# Patient Record
Sex: MALE | Race: WHITE | NOT HISPANIC OR LATINO | Employment: OTHER | URBAN - NONMETROPOLITAN AREA
[De-identification: names, ages, dates, MRNs, and addresses within clinical notes are randomized per-mention and may not be internally consistent; named-entity substitution may affect disease eponyms.]

---

## 2024-06-24 ENCOUNTER — OFFICE VISIT (OUTPATIENT)
Dept: CARDIOLOGY | Facility: CLINIC | Age: 54
End: 2024-06-24
Payer: MEDICARE

## 2024-06-24 VITALS
WEIGHT: 184 LBS | HEIGHT: 67 IN | SYSTOLIC BLOOD PRESSURE: 142 MMHG | DIASTOLIC BLOOD PRESSURE: 86 MMHG | OXYGEN SATURATION: 99 % | BODY MASS INDEX: 28.88 KG/M2 | HEART RATE: 82 BPM

## 2024-06-24 DIAGNOSIS — I44.7 LBBB (LEFT BUNDLE BRANCH BLOCK): ICD-10-CM

## 2024-06-24 DIAGNOSIS — R07.89 CHEST PAIN, ATYPICAL: Primary | ICD-10-CM

## 2024-06-24 DIAGNOSIS — R06.09 DYSPNEA ON EXERTION: ICD-10-CM

## 2024-06-24 PROCEDURE — 99204 OFFICE O/P NEW MOD 45 MIN: CPT | Performed by: EMERGENCY MEDICINE

## 2024-06-24 RX ORDER — OLMESARTAN MEDOXOMIL AND HYDROCHLOROTHIAZIDE 40/12.5 40; 12.5 MG/1; MG/1
TABLET ORAL
COMMUNITY
Start: 2024-05-20

## 2024-07-01 ENCOUNTER — HOSPITAL ENCOUNTER (OUTPATIENT)
Dept: CARDIOLOGY | Facility: HOSPITAL | Age: 54
Discharge: HOME | End: 2024-07-01
Admitting: EMERGENCY MEDICINE
Payer: MEDICARE

## 2024-07-01 VITALS
HEART RATE: 62 BPM | SYSTOLIC BLOOD PRESSURE: 132 MMHG | BODY MASS INDEX: 28.88 KG/M2 | DIASTOLIC BLOOD PRESSURE: 89 MMHG | WEIGHT: 184 LBS | HEIGHT: 67 IN

## 2024-07-01 DIAGNOSIS — R07.89 CHEST PAIN, ATYPICAL: ICD-10-CM

## 2024-07-01 LAB
BH CV STRESS BP STAGE 1: NORMAL
BH CV STRESS BP STAGE 2: NORMAL
BH CV STRESS BP STAGE 3: NORMAL
BH CV STRESS DURATION MIN STAGE 1: 3
BH CV STRESS DURATION MIN STAGE 2: 3
BH CV STRESS DURATION MIN STAGE 3: 3
BH CV STRESS DURATION SEC STAGE 1: 0
BH CV STRESS DURATION SEC STAGE 2: 0
BH CV STRESS DURATION SEC STAGE 3: 0
BH CV STRESS GRADE STAGE 1: 10
BH CV STRESS GRADE STAGE 2: 12
BH CV STRESS GRADE STAGE 3: 14
BH CV STRESS HR STAGE 1: 100
BH CV STRESS HR STAGE 2: 116
BH CV STRESS HR STAGE 3: 135
BH CV STRESS METS STAGE 1: 5
BH CV STRESS METS STAGE 2: 7.5
BH CV STRESS METS STAGE 3: 10
BH CV STRESS PROTOCOL 1: NORMAL
BH CV STRESS RECOVERY BP: NORMAL MMHG
BH CV STRESS RECOVERY HR: 68 BPM
BH CV STRESS SPEED STAGE 1: 1.7
BH CV STRESS SPEED STAGE 2: 2.5
BH CV STRESS SPEED STAGE 3: 3.4
BH CV STRESS STAGE 1: 1
BH CV STRESS STAGE 2: 2
BH CV STRESS STAGE 3: 3
MAXIMAL PREDICTED HEART RATE: 164 BPM
PERCENT MAX PREDICTED HR: 82.32 %
STRESS BASELINE BP: NORMAL MMHG
STRESS BASELINE HR: 60 BPM
STRESS PERCENT HR: 97 %
STRESS POST ESTIMATED WORKLOAD: 10 METS
STRESS POST EXERCISE DUR MIN: 9 MIN
STRESS POST EXERCISE DUR SEC: 0 SEC
STRESS POST PEAK BP: NORMAL MMHG
STRESS POST PEAK HR: 135 BPM
STRESS TARGET HR: 139 BPM

## 2024-07-01 PROCEDURE — 25510000001 PERFLUTREN 6.52 MG/ML SUSPENSION: Performed by: EMERGENCY MEDICINE

## 2024-07-01 PROCEDURE — 93352 ADMIN ECG CONTRAST AGENT: CPT | Performed by: EMERGENCY MEDICINE

## 2024-07-01 PROCEDURE — 93017 CV STRESS TEST TRACING ONLY: CPT

## 2024-07-01 PROCEDURE — 93018 CV STRESS TEST I&R ONLY: CPT | Performed by: EMERGENCY MEDICINE

## 2024-07-01 PROCEDURE — 93350 STRESS TTE ONLY: CPT | Performed by: EMERGENCY MEDICINE

## 2024-07-01 PROCEDURE — 93350 STRESS TTE ONLY: CPT

## 2024-07-01 RX ADMIN — PERFLUTREN 8.48 MG: 6.52 INJECTION, SUSPENSION INTRAVENOUS at 11:22

## 2024-07-01 NOTE — PROGRESS NOTES
P - CARDIOLOGY  New Patient Initial Outpatient [facility]    Primary Care Physician: Amandeep Nagel MD    Subjective     Chief Complaint   Patient presents with    Abnormal ECG        History of Present Illness  History of Present Illness  The patient presents for establishment of care.    The patient underwent an EKG, during which he was informed that the results were slightly different. Subsequently, he was unable to secure an appointment at [place] in [facility]. Approximately 1.5 months later, he was unable to attend the appointment due to a miscommunication with the physician. Subsequently, he underwent a routine checkup with his primary care physician, during which blood work was conducted. Despite this, he was informed that a follow-up was not scheduled. The patient denies experiencing chest pain, tightness, or pressure. He does, however, report occasional shortness of breath, which he attributes to his inactivity. Despite this, he is able to ambulate long distances without difficulty. He has a medical history of hypertension and hypercholesterolemia. His cholesterol levels have shown improvement, as evidenced by his reduced food intake and increased physical activity. He has also lost 10 pounds. His current medication regimen includes atorvastatin 20 mg, Olmesartan HCTZ, and cholesterol medication. He has undergone EKGs, stress tests, ultrasounds, or heart catheterizations.   He used to be a smoker, but he has quit.   His father had a heart attack at the age of 47 and was overweight.    Review of Systems   Constitutional: Negative for diaphoresis, fever and malaise/fatigue.   HENT:  Negative for congestion.    Eyes:  Negative for vision loss in left eye and vision loss in right eye.   Cardiovascular:  Positive for chest pain. Negative for claudication, dyspnea on exertion, irregular heartbeat, leg swelling, orthopnea, palpitations and syncope.   Respiratory:  Positive for shortness of breath. Negative  "for cough and wheezing.    Hematologic/Lymphatic: Negative for adenopathy.   Skin:  Negative for rash.   Musculoskeletal:  Negative for joint pain and joint swelling.   Gastrointestinal:  Negative for abdominal pain, diarrhea, nausea and vomiting.   Neurological:  Negative for excessive daytime sleepiness, dizziness, focal weakness, light-headedness, numbness and weakness.   Psychiatric/Behavioral:  Negative for depression. The patient does not have insomnia.         Otherwise complete ROS reviewed and negative except as mentioned in the HPI.      Past Medical History: No past medical history on file.    Past Surgical History:No past surgical history on file.    Family History: family history includes Heart attack in his father.    Social History:  reports that he has been smoking cigarettes. He has never used smokeless tobacco. He reports current alcohol use. He reports that he does not use drugs.    Medications:  Prior to Admission medications    Medication Sig Start Date End Date Taking? Authorizing Provider   olmesartan-hydrochlorothiazide (BENICAR HCT) 40-12.5 MG per tablet  5/20/24  Yes Provider, Historical, MD     Allergies:  No Known Allergies    Objective     Vital Signs: /86   Pulse 82   Ht 170.2 cm (67\")   Wt 83.5 kg (184 lb)   SpO2 99%   BMI 28.82 kg/m²     Vitals and nursing note reviewed.   Constitutional:       Appearance: Normal and healthy appearance. Well-developed and not in distress.   Eyes:      Extraocular Movements: Extraocular movements intact.      Pupils: Pupils are equal, round, and reactive to light.   HENT:      Head: Normocephalic and atraumatic.    Mouth/Throat:      Pharynx: Oropharynx is clear.   Neck:      Vascular: JVD normal.      Trachea: Trachea normal.   Pulmonary:      Effort: Pulmonary effort is normal.      Breath sounds: Normal breath sounds. No wheezing. No rhonchi. No rales.   Cardiovascular:      PMI at left midclavicular line. Normal rate. Regular rhythm. " "Normal S1. Normal S2.       Murmurs: There is a grade 2/6 systolic murmur.      No gallop.  No click. No rub.   Pulses:     Dorsalis pedis: 2+ bilaterally.     Posterior tibial: 2+ bilaterally.  Abdominal:      General: Bowel sounds are normal.      Palpations: Abdomen is soft.      Tenderness: There is no abdominal tenderness.   Musculoskeletal: Normal range of motion.      Cervical back: Normal range of motion and neck supple. Skin:     General: Skin is warm and dry.      Capillary Refill: Capillary refill takes less than 2 seconds.   Feet:      Right foot:      Skin integrity: Skin integrity normal.      Left foot:      Skin integrity: Skin integrity normal.   Neurological:      Mental Status: Alert and oriented to person, place and time.      Sensory: Sensation is intact.      Motor: Motor function is intact.      Coordination: Coordination is intact.   Psychiatric:         Speech: Speech normal.         Behavior: Behavior is cooperative.       Physical Exam      Results Reviewed:      ECG 12 Lead    Date/Time: 7/7/2024 7:40 PM  Performed by: Byron Garcia DO    Authorized by: Byron Garcia DO  Comparison: compared with previous ECG   Similar to previous ECG  Rhythm: sinus rhythm  Rate: normal  Conduction: left bundle branch block  QRS axis: left    Clinical impression: abnormal EKG          Results  Laboratory Studies  Total cholesterol 211, bad cholesterol 138, triglyceride 156, and good cholesterol 42.    Testing  EKG showed left bundle branch block.    No results found for: \"CHOL\", \"TRIG\", \"HDL\", \"VLDL\", \"LDLHDL\"  No results found for: \"HGBA1C\"    Assessment / Plan        Problem List Items Addressed This Visit       Chest pain, atypical - Primary    Relevant Orders    Adult Stress Echo W/ Cont or Stress Agent if Necessary Per Protocol (Completed)    Dyspnea on exertion    Relevant Orders    Adult Transthoracic Echo Complete W/ Cont if Necessary Per Protocol    LBBB (left bundle " branch block)     Assessment & Plan  1. Cardiac evaluation.  Given the patient's familial predisposition, it is prudent to conduct a stress echocardiogram and a dobutamine echocardiogram to ensure optimal cardiac health. Should any abnormalities be detected, we will discuss the subsequent course of action.    Follow-up  The patient is scheduled for a follow-up visit in 1 month.      Transcribed from ambient dictation for Byron Garcia DO by Byron Garcia DO.  07/01/24   13:32 CDT    Patient or patient representative verbalized consent for the use of Ambient Listening during the visit with  Byron Garcia DO for chart documentation. 7/7/2024  19:41 CDT

## 2024-07-05 DIAGNOSIS — R94.39 ABNORMAL STRESS TEST: Primary | ICD-10-CM

## 2024-07-07 PROBLEM — I44.7 LBBB (LEFT BUNDLE BRANCH BLOCK): Status: ACTIVE | Noted: 2024-07-07

## 2024-07-07 PROBLEM — R06.09 DYSPNEA ON EXERTION: Status: ACTIVE | Noted: 2024-07-07

## 2024-07-07 PROBLEM — R07.89 CHEST PAIN, ATYPICAL: Status: ACTIVE | Noted: 2024-07-07

## 2024-07-07 PROCEDURE — 93000 ELECTROCARDIOGRAM COMPLETE: CPT | Performed by: EMERGENCY MEDICINE

## 2024-07-12 ENCOUNTER — HOSPITAL ENCOUNTER (OUTPATIENT)
Facility: HOSPITAL | Age: 54
Setting detail: HOSPITAL OUTPATIENT SURGERY
Discharge: HOME | End: 2024-07-12
Attending: EMERGENCY MEDICINE | Admitting: EMERGENCY MEDICINE
Payer: MEDICARE

## 2024-07-12 VITALS
HEART RATE: 64 BPM | HEIGHT: 67 IN | RESPIRATION RATE: 14 BRPM | SYSTOLIC BLOOD PRESSURE: 140 MMHG | DIASTOLIC BLOOD PRESSURE: 85 MMHG | TEMPERATURE: 97.6 F | WEIGHT: 185.2 LBS | BODY MASS INDEX: 29.07 KG/M2 | OXYGEN SATURATION: 96 %

## 2024-07-12 DIAGNOSIS — R94.39 ABNORMAL STRESS TEST: ICD-10-CM

## 2024-07-12 LAB
ANION GAP SERPL CALCULATED.3IONS-SCNC: 11 MMOL/L (ref 5–15)
BASOPHILS # BLD AUTO: 0.08 10*3/MM3 (ref 0–0.2)
BASOPHILS NFR BLD AUTO: 0.9 % (ref 0–1.5)
BUN SERPL-MCNC: 20 MG/DL (ref 6–20)
BUN/CREAT SERPL: 19.6 (ref 7–25)
CALCIUM SPEC-SCNC: 9.1 MG/DL (ref 8.6–10.5)
CHLORIDE SERPL-SCNC: 100 MMOL/L (ref 98–107)
CO2 SERPL-SCNC: 25 MMOL/L (ref 22–29)
CREAT SERPL-MCNC: 1.02 MG/DL (ref 0.76–1.27)
DEPRECATED RDW RBC AUTO: 42.3 FL (ref 37–54)
EGFRCR SERPLBLD CKD-EPI 2021: 86.3 ML/MIN/1.73
EOSINOPHIL # BLD AUTO: 0.09 10*3/MM3 (ref 0–0.4)
EOSINOPHIL NFR BLD AUTO: 1 % (ref 0.3–6.2)
ERYTHROCYTE [DISTWIDTH] IN BLOOD BY AUTOMATED COUNT: 12.8 % (ref 12.3–15.4)
GLUCOSE SERPL-MCNC: 111 MG/DL (ref 65–99)
HCT VFR BLD AUTO: 46.6 % (ref 37.5–51)
HGB BLD-MCNC: 15.9 G/DL (ref 13–17.7)
IMM GRANULOCYTES # BLD AUTO: 0.03 10*3/MM3 (ref 0–0.05)
IMM GRANULOCYTES NFR BLD AUTO: 0.3 % (ref 0–0.5)
INR PPP: 0.95 (ref 0.91–1.09)
LYMPHOCYTES # BLD AUTO: 2.47 10*3/MM3 (ref 0.7–3.1)
LYMPHOCYTES NFR BLD AUTO: 26.4 % (ref 19.6–45.3)
MCH RBC QN AUTO: 30.8 PG (ref 26.6–33)
MCHC RBC AUTO-ENTMCNC: 34.1 G/DL (ref 31.5–35.7)
MCV RBC AUTO: 90.3 FL (ref 79–97)
MONOCYTES # BLD AUTO: 0.62 10*3/MM3 (ref 0.1–0.9)
MONOCYTES NFR BLD AUTO: 6.6 % (ref 5–12)
NEUTROPHILS NFR BLD AUTO: 6.07 10*3/MM3 (ref 1.7–7)
NEUTROPHILS NFR BLD AUTO: 64.8 % (ref 42.7–76)
NRBC BLD AUTO-RTO: 0 /100 WBC (ref 0–0.2)
PLATELET # BLD AUTO: 175 10*3/MM3 (ref 140–450)
PMV BLD AUTO: 10.9 FL (ref 6–12)
POTASSIUM SERPL-SCNC: 4 MMOL/L (ref 3.5–5.2)
PROTHROMBIN TIME: 13.1 SECONDS (ref 11.8–14.8)
RBC # BLD AUTO: 5.16 10*6/MM3 (ref 4.14–5.8)
SODIUM SERPL-SCNC: 136 MMOL/L (ref 136–145)
WBC NRBC COR # BLD AUTO: 9.36 10*3/MM3 (ref 3.4–10.8)

## 2024-07-12 PROCEDURE — S0260 H&P FOR SURGERY: HCPCS | Performed by: EMERGENCY MEDICINE

## 2024-07-12 PROCEDURE — 85025 COMPLETE CBC W/AUTO DIFF WBC: CPT | Performed by: EMERGENCY MEDICINE

## 2024-07-12 PROCEDURE — 80048 BASIC METABOLIC PNL TOTAL CA: CPT | Performed by: EMERGENCY MEDICINE

## 2024-07-12 PROCEDURE — 85610 PROTHROMBIN TIME: CPT | Performed by: EMERGENCY MEDICINE

## 2024-07-12 RX ORDER — SODIUM CHLORIDE 0.9 % (FLUSH) 0.9 %
10 SYRINGE (ML) INJECTION AS NEEDED
Status: DISCONTINUED | OUTPATIENT
Start: 2024-07-12 | End: 2024-07-12 | Stop reason: HOSPADM

## 2024-07-12 RX ORDER — SODIUM CHLORIDE 0.9 % (FLUSH) 0.9 %
10 SYRINGE (ML) INJECTION EVERY 12 HOURS SCHEDULED
Status: DISCONTINUED | OUTPATIENT
Start: 2024-07-12 | End: 2024-07-12 | Stop reason: HOSPADM

## 2024-07-12 NOTE — H&P
Patient seen and examined at bedside.  The history and physical not changed as below.    We will be performing a diagnostic left heart catheterization today with possible intervention based on findings.    Risk, benefits and alternatives were explained to the patient he wished to proceed.         P - CARDIOLOGY  New Patient Initial Outpatient [facility]     Primary Care Physician: Amandeep Nagel MD     Subjective          Chief Complaint   Patient presents with    Abnormal ECG         History of Present Illness  History of Present Illness  The patient presents for establishment of care.     The patient underwent an EKG, during which he was informed that the results were slightly different. Subsequently, he was unable to secure an appointment at [place] in [facility]. Approximately 1.5 months later, he was unable to attend the appointment due to a miscommunication with the physician. Subsequently, he underwent a routine checkup with his primary care physician, during which blood work was conducted. Despite this, he was informed that a follow-up was not scheduled. The patient denies experiencing chest pain, tightness, or pressure. He does, however, report occasional shortness of breath, which he attributes to his inactivity. Despite this, he is able to ambulate long distances without difficulty. He has a medical history of hypertension and hypercholesterolemia. His cholesterol levels have shown improvement, as evidenced by his reduced food intake and increased physical activity. He has also lost 10 pounds. His current medication regimen includes atorvastatin 20 mg, Olmesartan HCTZ, and cholesterol medication. He has undergone EKGs, stress tests, ultrasounds, or heart catheterizations.   He used to be a smoker, but he has quit.   His father had a heart attack at the age of 47 and was overweight.     Review of Systems   Constitutional: Negative for diaphoresis, fever and malaise/fatigue.   HENT:  Negative for  "congestion.    Eyes:  Negative for vision loss in left eye and vision loss in right eye.   Cardiovascular:  Positive for chest pain. Negative for claudication, dyspnea on exertion, irregular heartbeat, leg swelling, orthopnea, palpitations and syncope.   Respiratory:  Positive for shortness of breath. Negative for cough and wheezing.    Hematologic/Lymphatic: Negative for adenopathy.   Skin:  Negative for rash.   Musculoskeletal:  Negative for joint pain and joint swelling.   Gastrointestinal:  Negative for abdominal pain, diarrhea, nausea and vomiting.   Neurological:  Negative for excessive daytime sleepiness, dizziness, focal weakness, light-headedness, numbness and weakness.   Psychiatric/Behavioral:  Negative for depression. The patient does not have insomnia.          Otherwise complete ROS reviewed and negative except as mentioned in the HPI.        Past Medical History:   Medical History   No past medical history on file.        Past Surgical History:  Surgical History   No past surgical history on file.        Family History: family history includes Heart attack in his father.     Social History:  reports that he has been smoking cigarettes. He has never used smokeless tobacco. He reports current alcohol use. He reports that he does not use drugs.     Medications:          Prior to Admission medications    Medication Sig Start Date End Date Taking? Authorizing Provider   olmesartan-hydrochlorothiazide (BENICAR HCT) 40-12.5 MG per tablet   5/20/24   Yes Provider, Historical, MD      Allergies:  Allergies   No Known Allergies        Objective      Vital Signs: /86   Pulse 82   Ht 170.2 cm (67\")   Wt 83.5 kg (184 lb)   SpO2 99%   BMI 28.82 kg/m²      Vitals and nursing note reviewed.   Constitutional:       Appearance: Normal and healthy appearance. Well-developed and not in distress.   Eyes:      Extraocular Movements: Extraocular movements intact.      Pupils: Pupils are equal, round, and reactive " "to light.   HENT:      Head: Normocephalic and atraumatic.    Mouth/Throat:      Pharynx: Oropharynx is clear.   Neck:      Vascular: JVD normal.      Trachea: Trachea normal.   Pulmonary:      Effort: Pulmonary effort is normal.      Breath sounds: Normal breath sounds. No wheezing. No rhonchi. No rales.   Cardiovascular:      PMI at left midclavicular line. Normal rate. Regular rhythm. Normal S1. Normal S2.       Murmurs: There is a grade 2/6 systolic murmur.      No gallop.  No click. No rub.   Pulses:     Dorsalis pedis: 2+ bilaterally.     Posterior tibial: 2+ bilaterally.  Abdominal:      General: Bowel sounds are normal.      Palpations: Abdomen is soft.      Tenderness: There is no abdominal tenderness.   Musculoskeletal: Normal range of motion.      Cervical back: Normal range of motion and neck supple. Skin:     General: Skin is warm and dry.      Capillary Refill: Capillary refill takes less than 2 seconds.   Feet:      Right foot:      Skin integrity: Skin integrity normal.      Left foot:      Skin integrity: Skin integrity normal.   Neurological:      Mental Status: Alert and oriented to person, place and time.      Sensory: Sensation is intact.      Motor: Motor function is intact.      Coordination: Coordination is intact.   Psychiatric:         Speech: Speech normal.         Behavior: Behavior is cooperative.         Physical Exam        Results Reviewed:        ECG 12 Lead     Date/Time: 7/7/2024 7:40 PM  Performed by: Byron Garcia DO     Authorized by: Byron Garcia DO  Comparison: compared with previous ECG   Similar to previous ECG  Rhythm: sinus rhythm  Rate: normal  Conduction: left bundle branch block  QRS axis: left     Clinical impression: abnormal EKG              Results  Laboratory Studies  Total cholesterol 211, bad cholesterol 138, triglyceride 156, and good cholesterol 42.     Testing  EKG showed left bundle branch block.     No results found for: \"CHOL\", " "\"TRIG\", \"HDL\", \"VLDL\", \"LDLHDL\"  No results found for: \"HGBA1C\"     Assessment / Plan         Problem List Items Addressed This Visit         Chest pain, atypical - Primary     Relevant Orders     Adult Stress Echo W/ Cont or Stress Agent if Necessary Per Protocol (Completed)     Dyspnea on exertion     Relevant Orders     Adult Transthoracic Echo Complete W/ Cont if Necessary Per Protocol     LBBB (left bundle branch block)      Assessment & Plan  1. Cardiac evaluation.  Given the patient's familial predisposition, it is prudent to conduct a stress echocardiogram and a dobutamine echocardiogram to ensure optimal cardiac health. Should any abnormalities be detected, we will discuss the subsequent course of action.     Follow-up  The patient is scheduled for a follow-up visit in 1 month.        Transcribed from ambient dictation for Byron Garcia DO by Byron Garcia DO.  07/01/24   13:32 CDT         Interpretation Summary         Overall, this is a high risk test for ischemia.  Recommend further evaluation to rule out significant coronary artery disease in this patient.    Abnormal stress echo consistent with a high risk study for myocardial ischemia.    Left ventricular systolic function is moderately decreased. Left ventricular ejection fraction appears to be 36 - 40%.    The following left ventricular wall segments are hypokinetic: apical anterior, apical lateral, apical inferior, mid inferior, apical septal, mid inferoseptal and apex hypokinetic.    Ding treadmill score of 9    Excellent functional capacity at 10 metabolic equivalents    Equivocal EKG changes during stress, no clinical symptoms of ischemia during stress    Appropriate heart rate response to stress, hypertensive blood pressure response to stress     "

## 2024-07-12 NOTE — NURSING NOTE
Pt called me into the room to ask how much longer until his procedure, he was scheduled at 1100, this was 1120. I informed pt that Dr. Garcia was almost done with the case, however patient stated that he would just rather reschedule due to his nerves. Informed reading room to reschedule and notified patient that he will be called with new date.   Pt was d/c in no acute distress.

## 2024-07-25 DIAGNOSIS — R94.39 ABNORMAL STRESS TEST: Primary | ICD-10-CM

## 2024-08-02 ENCOUNTER — HOSPITAL ENCOUNTER (OUTPATIENT)
Facility: HOSPITAL | Age: 54
Setting detail: HOSPITAL OUTPATIENT SURGERY
Discharge: HOME | End: 2024-08-02
Attending: EMERGENCY MEDICINE | Admitting: EMERGENCY MEDICINE
Payer: MEDICARE

## 2024-08-02 ENCOUNTER — HOSPITAL ENCOUNTER (OUTPATIENT)
Dept: CARDIOLOGY | Facility: HOSPITAL | Age: 54
Discharge: HOME | End: 2024-08-02
Payer: MEDICARE

## 2024-08-02 VITALS
RESPIRATION RATE: 20 BRPM | DIASTOLIC BLOOD PRESSURE: 70 MMHG | HEART RATE: 59 BPM | OXYGEN SATURATION: 97 % | SYSTOLIC BLOOD PRESSURE: 151 MMHG | BODY MASS INDEX: 28.25 KG/M2 | HEIGHT: 67 IN | WEIGHT: 180 LBS

## 2024-08-02 VITALS
SYSTOLIC BLOOD PRESSURE: 140 MMHG | BODY MASS INDEX: 29.03 KG/M2 | DIASTOLIC BLOOD PRESSURE: 85 MMHG | WEIGHT: 185 LBS | HEIGHT: 67 IN

## 2024-08-02 DIAGNOSIS — R94.39 ABNORMAL STRESS TEST: ICD-10-CM

## 2024-08-02 DIAGNOSIS — R06.09 DYSPNEA ON EXERTION: ICD-10-CM

## 2024-08-02 LAB
ANION GAP SERPL CALCULATED.3IONS-SCNC: 11 MMOL/L (ref 5–15)
BH CV ECHO LEFT VENTRICLE GLOBAL LONGITUDINAL STRAIN: -11 %
BH CV ECHO MEAS - AO MAX PG: 4.7 MMHG
BH CV ECHO MEAS - AO MEAN PG: 2 MMHG
BH CV ECHO MEAS - AO ROOT DIAM: 3.1 CM
BH CV ECHO MEAS - AO V2 MAX: 108 CM/SEC
BH CV ECHO MEAS - AO V2 VTI: 24.4 CM
BH CV ECHO MEAS - AVA(I,D): 3.9 CM2
BH CV ECHO MEAS - EDV(CUBED): 188.1 ML
BH CV ECHO MEAS - EDV(MOD-SP2): 118 ML
BH CV ECHO MEAS - EDV(MOD-SP4): 112 ML
BH CV ECHO MEAS - EF(MOD-BP): 50 %
BH CV ECHO MEAS - EF(MOD-SP2): 56.1 %
BH CV ECHO MEAS - EF(MOD-SP4): 47.9 %
BH CV ECHO MEAS - ESV(CUBED): 58 ML
BH CV ECHO MEAS - ESV(MOD-SP2): 51.8 ML
BH CV ECHO MEAS - ESV(MOD-SP4): 58.4 ML
BH CV ECHO MEAS - FS: 32.5 %
BH CV ECHO MEAS - IVS/LVPW: 0.88 CM
BH CV ECHO MEAS - IVSD: 1.24 CM
BH CV ECHO MEAS - LA DIMENSION: 4.3 CM
BH CV ECHO MEAS - LAT PEAK E' VEL: 7.1 CM/SEC
BH CV ECHO MEAS - LV DIASTOLIC VOL/BSA (35-75): 57.3 CM2
BH CV ECHO MEAS - LV MASS(C)D: 333.7 GRAMS
BH CV ECHO MEAS - LV MAX PG: 3 MMHG
BH CV ECHO MEAS - LV MEAN PG: 1 MMHG
BH CV ECHO MEAS - LV SYSTOLIC VOL/BSA (12-30): 29.9 CM2
BH CV ECHO MEAS - LV V1 MAX: 86.2 CM/SEC
BH CV ECHO MEAS - LV V1 VTI: 18.1 CM
BH CV ECHO MEAS - LVIDD: 5.7 CM
BH CV ECHO MEAS - LVIDS: 3.9 CM
BH CV ECHO MEAS - LVOT AREA: 5.3 CM2
BH CV ECHO MEAS - LVOT DIAM: 2.6 CM
BH CV ECHO MEAS - LVPWD: 1.41 CM
BH CV ECHO MEAS - MED PEAK E' VEL: 4.6 CM/SEC
BH CV ECHO MEAS - MR MAX PG: 43 MMHG
BH CV ECHO MEAS - MR MAX VEL: 328 CM/SEC
BH CV ECHO MEAS - MV A MAX VEL: 65.5 CM/SEC
BH CV ECHO MEAS - MV DEC TIME: 0.25 SEC
BH CV ECHO MEAS - MV E MAX VEL: 51.4 CM/SEC
BH CV ECHO MEAS - MV E/A: 0.78
BH CV ECHO MEAS - PA V2 MAX: 161 CM/SEC
BH CV ECHO MEAS - RAP SYSTOLE: 3 MMHG
BH CV ECHO MEAS - RVSP: 5.7 MMHG
BH CV ECHO MEAS - SV(LVOT): 96.1 ML
BH CV ECHO MEAS - SV(MOD-SP2): 66.2 ML
BH CV ECHO MEAS - SV(MOD-SP4): 53.6 ML
BH CV ECHO MEAS - SVI(LVOT): 49.1 ML/M2
BH CV ECHO MEAS - SVI(MOD-SP2): 33.8 ML/M2
BH CV ECHO MEAS - SVI(MOD-SP4): 27.4 ML/M2
BH CV ECHO MEAS - TAPSE (>1.6): 1.93 CM
BH CV ECHO MEAS - TR MAX PG: 2.7 MMHG
BH CV ECHO MEAS - TR MAX VEL: 82.1 CM/SEC
BH CV ECHO MEASUREMENTS AVERAGE E/E' RATIO: 8.79
BUN SERPL-MCNC: 16 MG/DL (ref 6–20)
BUN/CREAT SERPL: 17 (ref 7–25)
CALCIUM SPEC-SCNC: 9 MG/DL (ref 8.6–10.5)
CHLORIDE SERPL-SCNC: 100 MMOL/L (ref 98–107)
CO2 SERPL-SCNC: 26 MMOL/L (ref 22–29)
CREAT SERPL-MCNC: 0.94 MG/DL (ref 0.76–1.27)
DEPRECATED RDW RBC AUTO: 41.7 FL (ref 37–54)
EGFRCR SERPLBLD CKD-EPI 2021: 95.1 ML/MIN/1.73
ERYTHROCYTE [DISTWIDTH] IN BLOOD BY AUTOMATED COUNT: 12.6 % (ref 12.3–15.4)
GLUCOSE SERPL-MCNC: 94 MG/DL (ref 65–99)
HCT VFR BLD AUTO: 43.8 % (ref 37.5–51)
HGB BLD-MCNC: 15 G/DL (ref 13–17.7)
INR PPP: 0.96 (ref 0.91–1.09)
LEFT ATRIUM VOLUME INDEX: 19.5 ML/M2
MCH RBC QN AUTO: 31.3 PG (ref 26.6–33)
MCHC RBC AUTO-ENTMCNC: 34.2 G/DL (ref 31.5–35.7)
MCV RBC AUTO: 91.3 FL (ref 79–97)
PLATELET # BLD AUTO: 180 10*3/MM3 (ref 140–450)
PMV BLD AUTO: 11 FL (ref 6–12)
POTASSIUM SERPL-SCNC: 3.8 MMOL/L (ref 3.5–5.2)
PROTHROMBIN TIME: 13.2 SECONDS (ref 11.8–14.8)
RBC # BLD AUTO: 4.8 10*6/MM3 (ref 4.14–5.8)
SODIUM SERPL-SCNC: 137 MMOL/L (ref 136–145)
WBC NRBC COR # BLD AUTO: 10.78 10*3/MM3 (ref 3.4–10.8)

## 2024-08-02 PROCEDURE — 80048 BASIC METABOLIC PNL TOTAL CA: CPT | Performed by: EMERGENCY MEDICINE

## 2024-08-02 PROCEDURE — 25510000001 IOPAMIDOL PER 1 ML: Performed by: EMERGENCY MEDICINE

## 2024-08-02 PROCEDURE — 25010000002 MIDAZOLAM HCL (PF) 5 MG/5ML SOLUTION: Performed by: EMERGENCY MEDICINE

## 2024-08-02 PROCEDURE — 25010000002 DIPHENHYDRAMINE PER 50 MG: Performed by: EMERGENCY MEDICINE

## 2024-08-02 PROCEDURE — 93306 TTE W/DOPPLER COMPLETE: CPT

## 2024-08-02 PROCEDURE — 93458 L HRT ARTERY/VENTRICLE ANGIO: CPT | Performed by: EMERGENCY MEDICINE

## 2024-08-02 PROCEDURE — 93356 MYOCRD STRAIN IMG SPCKL TRCK: CPT

## 2024-08-02 PROCEDURE — 99152 MOD SED SAME PHYS/QHP 5/>YRS: CPT | Performed by: EMERGENCY MEDICINE

## 2024-08-02 PROCEDURE — S0260 H&P FOR SURGERY: HCPCS | Performed by: EMERGENCY MEDICINE

## 2024-08-02 PROCEDURE — C1769 GUIDE WIRE: HCPCS | Performed by: EMERGENCY MEDICINE

## 2024-08-02 PROCEDURE — 85027 COMPLETE CBC AUTOMATED: CPT | Performed by: EMERGENCY MEDICINE

## 2024-08-02 PROCEDURE — 85610 PROTHROMBIN TIME: CPT | Performed by: EMERGENCY MEDICINE

## 2024-08-02 PROCEDURE — 25010000002 HEPARIN (PORCINE) 2000-0.9 UNIT/L-% SOLUTION: Performed by: EMERGENCY MEDICINE

## 2024-08-02 PROCEDURE — 25010000002 HEPARIN (PORCINE) PER 1000 UNITS: Performed by: EMERGENCY MEDICINE

## 2024-08-02 PROCEDURE — 25010000002 FENTANYL CITRATE (PF) 50 MCG/ML SOLUTION: Performed by: EMERGENCY MEDICINE

## 2024-08-02 PROCEDURE — 25010000002 HEPARIN (PORCINE) 1000-0.9 UT/500ML-% SOLUTION: Performed by: EMERGENCY MEDICINE

## 2024-08-02 PROCEDURE — C1894 INTRO/SHEATH, NON-LASER: HCPCS | Performed by: EMERGENCY MEDICINE

## 2024-08-02 RX ORDER — HEPARIN SODIUM 200 [USP'U]/100ML
INJECTION, SOLUTION INTRAVENOUS
Status: DISCONTINUED | OUTPATIENT
Start: 2024-08-02 | End: 2024-08-02 | Stop reason: HOSPADM

## 2024-08-02 RX ORDER — SODIUM CHLORIDE 9 MG/ML
100 INJECTION, SOLUTION INTRAVENOUS CONTINUOUS
Status: CANCELLED | OUTPATIENT
Start: 2024-08-02

## 2024-08-02 RX ORDER — SODIUM CHLORIDE 0.9 % (FLUSH) 0.9 %
10 SYRINGE (ML) INJECTION AS NEEDED
Status: DISCONTINUED | OUTPATIENT
Start: 2024-08-02 | End: 2024-08-02 | Stop reason: HOSPADM

## 2024-08-02 RX ORDER — HEPARIN SODIUM 1000 [USP'U]/ML
INJECTION, SOLUTION INTRAVENOUS; SUBCUTANEOUS
Status: DISCONTINUED | OUTPATIENT
Start: 2024-08-02 | End: 2024-08-02 | Stop reason: HOSPADM

## 2024-08-02 RX ORDER — DIAZEPAM 10 MG/1
10 TABLET ORAL ONCE
Status: COMPLETED | OUTPATIENT
Start: 2024-08-02 | End: 2024-08-02

## 2024-08-02 RX ORDER — NITROGLYCERIN 0.4 MG/1
0.4 TABLET SUBLINGUAL
Status: CANCELLED | OUTPATIENT
Start: 2024-08-02

## 2024-08-02 RX ORDER — SODIUM CHLORIDE 0.9 % (FLUSH) 0.9 %
10 SYRINGE (ML) INJECTION EVERY 12 HOURS SCHEDULED
Status: DISCONTINUED | OUTPATIENT
Start: 2024-08-02 | End: 2024-08-02 | Stop reason: HOSPADM

## 2024-08-02 RX ORDER — ACETAMINOPHEN 325 MG/1
650 TABLET ORAL EVERY 4 HOURS PRN
Status: CANCELLED | OUTPATIENT
Start: 2024-08-02

## 2024-08-02 RX ORDER — DIPHENHYDRAMINE HYDROCHLORIDE 50 MG/ML
INJECTION INTRAMUSCULAR; INTRAVENOUS
Status: DISCONTINUED | OUTPATIENT
Start: 2024-08-02 | End: 2024-08-02 | Stop reason: HOSPADM

## 2024-08-02 RX ORDER — VERAPAMIL HYDROCHLORIDE 2.5 MG/ML
INJECTION, SOLUTION INTRAVENOUS
Status: DISCONTINUED | OUTPATIENT
Start: 2024-08-02 | End: 2024-08-02 | Stop reason: HOSPADM

## 2024-08-02 RX ORDER — FENTANYL CITRATE 50 UG/ML
INJECTION, SOLUTION INTRAMUSCULAR; INTRAVENOUS
Status: DISCONTINUED | OUTPATIENT
Start: 2024-08-02 | End: 2024-08-02 | Stop reason: HOSPADM

## 2024-08-02 RX ORDER — LIDOCAINE HYDROCHLORIDE 20 MG/ML
INJECTION, SOLUTION INFILTRATION; PERINEURAL
Status: DISCONTINUED | OUTPATIENT
Start: 2024-08-02 | End: 2024-08-02 | Stop reason: HOSPADM

## 2024-08-02 RX ORDER — SODIUM CHLORIDE 9 MG/ML
1 INJECTION, SOLUTION INTRAVENOUS CONTINUOUS
Status: CANCELLED | OUTPATIENT
Start: 2024-08-02 | End: 2024-08-02

## 2024-08-02 RX ORDER — MIDAZOLAM HYDROCHLORIDE 5 MG/5ML
INJECTION, SOLUTION INTRAMUSCULAR; INTRAVENOUS
Status: DISCONTINUED | OUTPATIENT
Start: 2024-08-02 | End: 2024-08-02 | Stop reason: HOSPADM

## 2024-08-02 RX ADMIN — DIAZEPAM 10 MG: 10 TABLET ORAL at 11:41

## 2024-08-02 NOTE — H&P
Seen and examined at bedside.  History and physical not changed as below.    We are performing diagnostic left heart catheterization with possible invention based on fines.    Risk benefits and alternatives were explained to the patient he wished proceed.    P - CARDIOLOGY  New Patient Initial Outpatient [facility]     Primary Care Physician: Amandeep Nagel MD     Subjective            Chief Complaint   Patient presents with    Abnormal ECG         History of Present Illness  History of Present Illness  The patient presents for establishment of care.     The patient underwent an EKG, during which he was informed that the results were slightly different. Subsequently, he was unable to secure an appointment at [place] in [facility]. Approximately 1.5 months later, he was unable to attend the appointment due to a miscommunication with the physician. Subsequently, he underwent a routine checkup with his primary care physician, during which blood work was conducted. Despite this, he was informed that a follow-up was not scheduled. The patient denies experiencing chest pain, tightness, or pressure. He does, however, report occasional shortness of breath, which he attributes to his inactivity. Despite this, he is able to ambulate long distances without difficulty. He has a medical history of hypertension and hypercholesterolemia. His cholesterol levels have shown improvement, as evidenced by his reduced food intake and increased physical activity. He has also lost 10 pounds. His current medication regimen includes atorvastatin 20 mg, Olmesartan HCTZ, and cholesterol medication. He has undergone EKGs, stress tests, ultrasounds, or heart catheterizations.   He used to be a smoker, but he has quit.   His father had a heart attack at the age of 47 and was overweight.     Review of Systems   Constitutional: Negative for diaphoresis, fever and malaise/fatigue.   HENT:  Negative for congestion.    Eyes:  Negative for vision loss  "in left eye and vision loss in right eye.   Cardiovascular:  Positive for chest pain. Negative for claudication, dyspnea on exertion, irregular heartbeat, leg swelling, orthopnea, palpitations and syncope.   Respiratory:  Positive for shortness of breath. Negative for cough and wheezing.    Hematologic/Lymphatic: Negative for adenopathy.   Skin:  Negative for rash.   Musculoskeletal:  Negative for joint pain and joint swelling.   Gastrointestinal:  Negative for abdominal pain, diarrhea, nausea and vomiting.   Neurological:  Negative for excessive daytime sleepiness, dizziness, focal weakness, light-headedness, numbness and weakness.   Psychiatric/Behavioral:  Negative for depression. The patient does not have insomnia.          Otherwise complete ROS reviewed and negative except as mentioned in the HPI.        Past Medical History:   Medical History   No past medical history on file.         Past Surgical History:  Surgical History   No past surgical history on file.         Family History: family history includes Heart attack in his father.     Social History:  reports that he has been smoking cigarettes. He has never used smokeless tobacco. He reports current alcohol use. He reports that he does not use drugs.     Medications:                Prior to Admission medications    Medication Sig Start Date End Date Taking? Authorizing Provider   olmesartan-hydrochlorothiazide (BENICAR HCT) 40-12.5 MG per tablet   5/20/24   Yes Provider, Historical, MD      Allergies:  Allergies   No Known Allergies         Objective      Vital Signs: /86   Pulse 82   Ht 170.2 cm (67\")   Wt 83.5 kg (184 lb)   SpO2 99%   BMI 28.82 kg/m²      Vitals and nursing note reviewed.   Constitutional:       Appearance: Normal and healthy appearance. Well-developed and not in distress.   Eyes:      Extraocular Movements: Extraocular movements intact.      Pupils: Pupils are equal, round, and reactive to light.   HENT:      Head: " "Normocephalic and atraumatic.    Mouth/Throat:      Pharynx: Oropharynx is clear.   Neck:      Vascular: JVD normal.      Trachea: Trachea normal.   Pulmonary:      Effort: Pulmonary effort is normal.      Breath sounds: Normal breath sounds. No wheezing. No rhonchi. No rales.   Cardiovascular:      PMI at left midclavicular line. Normal rate. Regular rhythm. Normal S1. Normal S2.       Murmurs: There is a grade 2/6 systolic murmur.      No gallop.  No click. No rub.   Pulses:     Dorsalis pedis: 2+ bilaterally.     Posterior tibial: 2+ bilaterally.  Abdominal:      General: Bowel sounds are normal.      Palpations: Abdomen is soft.      Tenderness: There is no abdominal tenderness.   Musculoskeletal: Normal range of motion.      Cervical back: Normal range of motion and neck supple. Skin:     General: Skin is warm and dry.      Capillary Refill: Capillary refill takes less than 2 seconds.   Feet:      Right foot:      Skin integrity: Skin integrity normal.      Left foot:      Skin integrity: Skin integrity normal.   Neurological:      Mental Status: Alert and oriented to person, place and time.      Sensory: Sensation is intact.      Motor: Motor function is intact.      Coordination: Coordination is intact.   Psychiatric:         Speech: Speech normal.         Behavior: Behavior is cooperative.         Physical Exam        Results Reviewed:        ECG 12 Lead     Date/Time: 7/7/2024 7:40 PM  Performed by: Byron Garcia DO     Authorized by: Byron Garcia DO  Comparison: compared with previous ECG   Similar to previous ECG  Rhythm: sinus rhythm  Rate: normal  Conduction: left bundle branch block  QRS axis: left     Clinical impression: abnormal EKG              Results  Laboratory Studies  Total cholesterol 211, bad cholesterol 138, triglyceride 156, and good cholesterol 42.     Testing  EKG showed left bundle branch block.     No results found for: \"CHOL\", \"TRIG\", \"HDL\", \"VLDL\", " "\"LDLHDL\"  No results found for: \"HGBA1C\"     Assessment / Plan         Problem List Items Addressed This Visit         Chest pain, atypical - Primary     Relevant Orders     Adult Stress Echo W/ Cont or Stress Agent if Necessary Per Protocol (Completed)     Dyspnea on exertion     Relevant Orders     Adult Transthoracic Echo Complete W/ Cont if Necessary Per Protocol     LBBB (left bundle branch block)      Assessment & Plan  1. Cardiac evaluation.  Given the patient's familial predisposition, it is prudent to conduct a stress echocardiogram and a dobutamine echocardiogram to ensure optimal cardiac health. Should any abnormalities be detected, we will discuss the subsequent course of action.     Follow-up  The patient is scheduled for a follow-up visit in 1 month.        Transcribed from ambient dictation for Byron Garcia DO by Byron Garcia DO.  07/01/24   13:32 CDT           Interpretation Summary          Overall, this is a high risk test for ischemia.  Recommend further evaluation to rule out significant coronary artery disease in this patient.    Abnormal stress echo consistent with a high risk study for myocardial ischemia.    Left ventricular systolic function is moderately decreased. Left ventricular ejection fraction appears to be 36 - 40%.    The following left ventricular wall segments are hypokinetic: apical anterior, apical lateral, apical inferior, mid inferior, apical septal, mid inferoseptal and apex hypokinetic.    Ding treadmill score of 9    Excellent functional capacity at 10 metabolic equivalents    Equivocal EKG changes during stress, no clinical symptoms of ischemia during stress    Appropriate heart rate response to stress, hypertensive blood pressure response to stress     "

## 2024-08-02 NOTE — OP NOTE
[facility]      Date of procedure: 8/2/2024     Procedures performed:     1.  Access to the right radial artery via modified Seldinger technique  2.  Left heart catheterization with retrograde crossing rebound to the left ventricle pressures were recorded  3.  LV ventriculography  4.  Selective bilateral coronary angiography  5.  Patent hemostasis achieved in the right radial artery access site using a TR band  6.  Conscious sedation with continuous hemodynamic monitoring for 15 minutes      Risk, Benefits, and Alternatives discussed with the patient and/or family.  Plan is for moderate sedation, and the patient agrees to proceed with the procedure.  An immediate assessment was done prior to the administration of moderate sedation        Indication: Abnormal stress test, family history of early coronary artery disease in first-degree relatives  Premedication: Versed, Fentanyl  Contrast: Isovue 67 cc  Radiation: Flouro time= 2.2 minutes. Air Kerma= 267 mGy  Catheters: 5F TIG, pigtail      Procedural details:    The patient was brought to the cath lab and prepped and draped in the usual fashion.  Access was obtained in the right radial artery via modified Seldinger technique.  A 6 Polish sheath was placed into the artery and flushed.  Next, a TIG catheter was inserted and used to engage both the left and right coronary arteries and selective bilateral coronary angiography was performed in multiple views.  A pigtail catheter was then inserted and used to cross aortic valve to the left ventricle pressures were recorded and LV ventriculography was performed.  This catheter was then pulled back across the aortic valve and again pressures were recorded.  Everything was then withdrawn to the sheath and the sheath was flushed.  Patent hemostasis was achieved in the right radial artery access site using a TR band.  Patient tolerated the procedure well without any complications.  He left the Cath Lab  in stable condition.    I supervised the administration of conscious sedation by nursing staff throughout the case.  First dose was given at 1259 and the end of my face-to-face encounter was at 1313, accounting for a total of 15 minutes of supervision.  During the case, continuous pulse oximetry, heart rate, blood pressure, and patient status were monitored.     Findings:    Hemodynamics:    Aortic: 137/87 mmHg  LV: 150/5 mmHg  LVEDP: 23 mmHg    Left ventriculography:  1. The contractility of the left ventricle is mildly reduced.  Estimated ejection fraction 45%.  2.  No significant gradient across aortic valve on pullback    Selective coronary angiography:     Left Main: Large-caliber vessel that bifurcates into the LAD and left circumflex, no angiographic evidence of stenosis, RENETTA-3 flow    LAD: Large-caliber vessel with no angiographic evidence of stenosis, RENETTA-3 flow    Diagonals: First diagonal is moderate caliber with no significant disease, the second diagonal is also moderate caliber with no significant disease, the third diagonal is small    Left circumflex: Large-caliber vessel with no angiographic evidence of stenosis, RENETTA-3 flow    Obtuse marginals: First OM is small, the second OM is small, the third OM is moderate caliber with no significant disease    Right coronary artery: Large-caliber vessel with no angiographic evidence of stenosis, RENETTA-3 flow    PDA/FANNIE: PDA is large caliber with no significant disease, FANNIE is moderate caliber with no significant disease    Estimated Blood Loss: 20 cc    Specimens: None    Complications: None     Impression:  1.  No significant coronary artery disease as described above     Plan:   1.  TR band off in 2 hours, discharge home later today  2.  Follow-up in the office to continue to optimize his cardiovascular regimen and modify his cardiovascular risk factors    Byron Garcia, DO  Interventional cardiology  [facility]  August 2, 2024

## 2024-08-06 LAB
BH CV ECHO LEFT VENTRICLE GLOBAL LONGITUDINAL STRAIN: -11 %
BH CV ECHO MEAS - AO MAX PG: 4.7 MMHG
BH CV ECHO MEAS - AO MEAN PG: 2 MMHG
BH CV ECHO MEAS - AO ROOT DIAM: 3.1 CM
BH CV ECHO MEAS - AO V2 MAX: 108 CM/SEC
BH CV ECHO MEAS - AO V2 VTI: 24.4 CM
BH CV ECHO MEAS - AVA(I,D): 3.9 CM2
BH CV ECHO MEAS - EDV(CUBED): 188.1 ML
BH CV ECHO MEAS - EDV(MOD-SP2): 118 ML
BH CV ECHO MEAS - EDV(MOD-SP4): 112 ML
BH CV ECHO MEAS - EF(MOD-BP): 50 %
BH CV ECHO MEAS - EF(MOD-SP2): 56.1 %
BH CV ECHO MEAS - EF(MOD-SP4): 47.9 %
BH CV ECHO MEAS - ESV(CUBED): 58 ML
BH CV ECHO MEAS - ESV(MOD-SP2): 51.8 ML
BH CV ECHO MEAS - ESV(MOD-SP4): 58.4 ML
BH CV ECHO MEAS - FS: 32.5 %
BH CV ECHO MEAS - IVS/LVPW: 0.88 CM
BH CV ECHO MEAS - IVSD: 1.24 CM
BH CV ECHO MEAS - LA DIMENSION: 4.3 CM
BH CV ECHO MEAS - LAT PEAK E' VEL: 7.1 CM/SEC
BH CV ECHO MEAS - LV DIASTOLIC VOL/BSA (35-75): 57.3 CM2
BH CV ECHO MEAS - LV MASS(C)D: 333.7 GRAMS
BH CV ECHO MEAS - LV MAX PG: 3 MMHG
BH CV ECHO MEAS - LV MEAN PG: 1 MMHG
BH CV ECHO MEAS - LV SYSTOLIC VOL/BSA (12-30): 29.9 CM2
BH CV ECHO MEAS - LV V1 MAX: 86.2 CM/SEC
BH CV ECHO MEAS - LV V1 VTI: 18.1 CM
BH CV ECHO MEAS - LVIDD: 5.7 CM
BH CV ECHO MEAS - LVIDS: 3.9 CM
BH CV ECHO MEAS - LVOT AREA: 5.3 CM2
BH CV ECHO MEAS - LVOT DIAM: 2.6 CM
BH CV ECHO MEAS - LVPWD: 1.41 CM
BH CV ECHO MEAS - MED PEAK E' VEL: 4.6 CM/SEC
BH CV ECHO MEAS - MR MAX PG: 43 MMHG
BH CV ECHO MEAS - MR MAX VEL: 328 CM/SEC
BH CV ECHO MEAS - MV A MAX VEL: 65.5 CM/SEC
BH CV ECHO MEAS - MV DEC TIME: 0.25 SEC
BH CV ECHO MEAS - MV E MAX VEL: 51.4 CM/SEC
BH CV ECHO MEAS - MV E/A: 0.78
BH CV ECHO MEAS - PA V2 MAX: 161 CM/SEC
BH CV ECHO MEAS - RAP SYSTOLE: 3 MMHG
BH CV ECHO MEAS - RVSP: 5.7 MMHG
BH CV ECHO MEAS - SV(LVOT): 96.1 ML
BH CV ECHO MEAS - SV(MOD-SP2): 66.2 ML
BH CV ECHO MEAS - SV(MOD-SP4): 53.6 ML
BH CV ECHO MEAS - SVI(LVOT): 49.1 ML/M2
BH CV ECHO MEAS - SVI(MOD-SP2): 33.8 ML/M2
BH CV ECHO MEAS - SVI(MOD-SP4): 27.4 ML/M2
BH CV ECHO MEAS - TAPSE (>1.6): 1.93 CM
BH CV ECHO MEAS - TR MAX PG: 2.7 MMHG
BH CV ECHO MEAS - TR MAX VEL: 82.1 CM/SEC
BH CV ECHO MEASUREMENTS AVERAGE E/E' RATIO: 8.79
LEFT ATRIUM VOLUME INDEX: 19.5 ML/M2

## 2024-08-07 ENCOUNTER — TELEPHONE (OUTPATIENT)
Dept: CARDIOLOGY | Facility: CLINIC | Age: 54
End: 2024-08-07

## 2024-08-07 NOTE — TELEPHONE ENCOUNTER
Caller: Galilea Gutierrez    Relationship: Self    Best call back number: [phone]     What is the best time to reach you: ANYTIME    Who are you requesting to speak with (clinical staff, provider,  specific staff member): CLINICAL    Do you know the name of the person who called: GALILEA    What was the call regarding: PT HAD HEART CATH DONE FRIDAY AND WAS WONDERING WHEN HE COULD MOW HIS LAWN AND DO LAWN WORK/PHYSICAL LABOR    Is it okay if the provider responds through MyChart: NO

## (undated) DEVICE — RADIFOCUS OPTITORQUE ANGIOGRAPHIC CATHETER: Brand: OPTITORQUE

## (undated) DEVICE — MODEL AT P65, P/N 701554-001KIT CONTENTS: HAND CONTROLLER, 3-WAY HIGH-PRESSURE STOPCOCK WITH ROTATING END AND PREMIUM HIGH-PRESSURE TUBING: Brand: ANGIOTOUCH® KIT

## (undated) DEVICE — TR BAND RADIAL ARTERY COMPRESSION DEVICE: Brand: TR BAND

## (undated) DEVICE — DRAPE,ANGIO,BRACH,STERILE,38X44: Brand: MEDLINE

## (undated) DEVICE — CANN NASL ETCO2 LO/FLO A/

## (undated) DEVICE — SOLIDIFIER LIQUI LOC PLUS 2000CC

## (undated) DEVICE — GLIDESHEATH SLENDER STAINLESS STEEL KIT: Brand: GLIDESHEATH SLENDER

## (undated) DEVICE — SOL IRR NACL 0.9PCT BT 1000ML

## (undated) DEVICE — DRSNG SURESITE WNDW 4X4.5

## (undated) DEVICE — PK CATH CARD 30 CA/4

## (undated) DEVICE — CATH F5 INF PIG145 110CM 6SH: Brand: INFINITI

## (undated) DEVICE — SUP ARMBRD ART/LINE BLU

## (undated) DEVICE — MODEL BT2000 P/N 700287-012KIT CONTENTS: MANIFOLD WITH SALINE AND CONTRAST PORTS, SALINE TUBING WITH SPIKE AND HAND SYRINGE, TRANSDUCER: Brand: BT2000 AUTOMATED MANIFOLD KIT

## (undated) DEVICE — GW STARTER FXD CORE J .035 3X260CM 3MM

## (undated) DEVICE — PAD, DEFIB, ADULT, RADIOTRANS, PHYSIO: Brand: MEDLINE